# Patient Record
Sex: MALE | Race: WHITE | NOT HISPANIC OR LATINO | Employment: UNEMPLOYED | URBAN - METROPOLITAN AREA
[De-identification: names, ages, dates, MRNs, and addresses within clinical notes are randomized per-mention and may not be internally consistent; named-entity substitution may affect disease eponyms.]

---

## 2020-11-01 ENCOUNTER — HOSPITAL ENCOUNTER (INPATIENT)
Facility: MEDICAL CENTER | Age: 40
LOS: 1 days | DRG: 390 | End: 2020-11-02
Attending: EMERGENCY MEDICINE | Admitting: STUDENT IN AN ORGANIZED HEALTH CARE EDUCATION/TRAINING PROGRAM
Payer: MEDICAID

## 2020-11-01 ENCOUNTER — HOSPITAL ENCOUNTER (OUTPATIENT)
Dept: RADIOLOGY | Facility: MEDICAL CENTER | Age: 40
End: 2020-11-01
Payer: MEDICAID

## 2020-11-01 ENCOUNTER — APPOINTMENT (OUTPATIENT)
Dept: RADIOLOGY | Facility: MEDICAL CENTER | Age: 40
DRG: 390 | End: 2020-11-01
Attending: STUDENT IN AN ORGANIZED HEALTH CARE EDUCATION/TRAINING PROGRAM
Payer: MEDICAID

## 2020-11-01 DIAGNOSIS — R11.2 NAUSEA AND VOMITING, INTRACTABILITY OF VOMITING NOT SPECIFIED, UNSPECIFIED VOMITING TYPE: ICD-10-CM

## 2020-11-01 DIAGNOSIS — K56.1 INTUSSUSCEPTION (HCC): ICD-10-CM

## 2020-11-01 PROBLEM — Z86.19 HISTORY OF CLOSTRIDIOIDES DIFFICILE COLITIS: Status: ACTIVE | Noted: 2020-11-01

## 2020-11-01 PROBLEM — R10.30 LOWER ABDOMINAL PAIN: Status: ACTIVE | Noted: 2020-11-01

## 2020-11-01 PROBLEM — F17.200 NICOTINE DEPENDENCE: Status: ACTIVE | Noted: 2020-11-01

## 2020-11-01 LAB
AMPHET UR QL SCN: NEGATIVE
ANION GAP SERPL CALC-SCNC: 9 MMOL/L (ref 7–16)
APPEARANCE UR: CLEAR
BARBITURATES UR QL SCN: NEGATIVE
BASOPHILS # BLD AUTO: 0.3 % (ref 0–1.8)
BASOPHILS # BLD: 0.03 K/UL (ref 0–0.12)
BENZODIAZ UR QL SCN: NEGATIVE
BILIRUB UR QL STRIP.AUTO: NEGATIVE
BUN SERPL-MCNC: 6 MG/DL (ref 8–22)
BZE UR QL SCN: NEGATIVE
CALCIUM SERPL-MCNC: 7.6 MG/DL (ref 8.5–10.5)
CANNABINOIDS UR QL SCN: POSITIVE
CHLORIDE SERPL-SCNC: 108 MMOL/L (ref 96–112)
CO2 SERPL-SCNC: 24 MMOL/L (ref 20–33)
COLOR UR: YELLOW
COVID ORDER STATUS COVID19: NORMAL
CREAT SERPL-MCNC: 0.63 MG/DL (ref 0.5–1.4)
EOSINOPHIL # BLD AUTO: 0.03 K/UL (ref 0–0.51)
EOSINOPHIL NFR BLD: 0.3 % (ref 0–6.9)
ERYTHROCYTE [DISTWIDTH] IN BLOOD BY AUTOMATED COUNT: 45.8 FL (ref 35.9–50)
GLUCOSE SERPL-MCNC: 85 MG/DL (ref 65–99)
GLUCOSE UR STRIP.AUTO-MCNC: NEGATIVE MG/DL
HCT VFR BLD AUTO: 40.6 % (ref 42–52)
HGB BLD-MCNC: 13.8 G/DL (ref 14–18)
IMM GRANULOCYTES # BLD AUTO: 0.01 K/UL (ref 0–0.11)
IMM GRANULOCYTES NFR BLD AUTO: 0.1 % (ref 0–0.9)
KETONES UR STRIP.AUTO-MCNC: ABNORMAL MG/DL
LEUKOCYTE ESTERASE UR QL STRIP.AUTO: NEGATIVE
LIPASE SERPL-CCNC: 15 U/L (ref 11–82)
LYMPHOCYTES # BLD AUTO: 2.16 K/UL (ref 1–4.8)
LYMPHOCYTES NFR BLD: 24.9 % (ref 22–41)
MCH RBC QN AUTO: 32 PG (ref 27–33)
MCHC RBC AUTO-ENTMCNC: 34 G/DL (ref 33.7–35.3)
MCV RBC AUTO: 94.2 FL (ref 81.4–97.8)
METHADONE UR QL SCN: NEGATIVE
MICRO URNS: ABNORMAL
MONOCYTES # BLD AUTO: 0.65 K/UL (ref 0–0.85)
MONOCYTES NFR BLD AUTO: 7.5 % (ref 0–13.4)
NEUTROPHILS # BLD AUTO: 5.8 K/UL (ref 1.82–7.42)
NEUTROPHILS NFR BLD: 66.9 % (ref 44–72)
NITRITE UR QL STRIP.AUTO: NEGATIVE
NRBC # BLD AUTO: 0 K/UL
NRBC BLD-RTO: 0 /100 WBC
OPIATES UR QL SCN: POSITIVE
OXYCODONE UR QL SCN: NEGATIVE
PCP UR QL SCN: NEGATIVE
PH UR STRIP.AUTO: 5 [PH] (ref 5–8)
PLATELET # BLD AUTO: 215 K/UL (ref 164–446)
PMV BLD AUTO: 10.7 FL (ref 9–12.9)
POTASSIUM SERPL-SCNC: 3.9 MMOL/L (ref 3.6–5.5)
PROPOXYPH UR QL SCN: NEGATIVE
PROT UR QL STRIP: NEGATIVE MG/DL
RBC # BLD AUTO: 4.31 M/UL (ref 4.7–6.1)
RBC UR QL AUTO: NEGATIVE
SARS-COV-2 RNA RESP QL NAA+PROBE: NOTDETECTED
SODIUM SERPL-SCNC: 141 MMOL/L (ref 135–145)
SP GR UR REFRACTOMETRY: >1.045
SPECIMEN SOURCE: NORMAL
UROBILINOGEN UR STRIP.AUTO-MCNC: 0.2 MG/DL
WBC # BLD AUTO: 8.7 K/UL (ref 4.8–10.8)

## 2020-11-01 PROCEDURE — 770001 HCHG ROOM/CARE - MED/SURG/GYN PRIV*

## 2020-11-01 PROCEDURE — 81003 URINALYSIS AUTO W/O SCOPE: CPT

## 2020-11-01 PROCEDURE — 99223 1ST HOSP IP/OBS HIGH 75: CPT | Performed by: STUDENT IN AN ORGANIZED HEALTH CARE EDUCATION/TRAINING PROGRAM

## 2020-11-01 PROCEDURE — 71045 X-RAY EXAM CHEST 1 VIEW: CPT

## 2020-11-01 PROCEDURE — 83690 ASSAY OF LIPASE: CPT

## 2020-11-01 PROCEDURE — U0003 INFECTIOUS AGENT DETECTION BY NUCLEIC ACID (DNA OR RNA); SEVERE ACUTE RESPIRATORY SYNDROME CORONAVIRUS 2 (SARS-COV-2) (CORONAVIRUS DISEASE [COVID-19]), AMPLIFIED PROBE TECHNIQUE, MAKING USE OF HIGH THROUGHPUT TECHNOLOGIES AS DESCRIBED BY CMS-2020-01-R: HCPCS

## 2020-11-01 PROCEDURE — 96374 THER/PROPH/DIAG INJ IV PUSH: CPT

## 2020-11-01 PROCEDURE — 99285 EMERGENCY DEPT VISIT HI MDM: CPT

## 2020-11-01 PROCEDURE — 700111 HCHG RX REV CODE 636 W/ 250 OVERRIDE (IP): Performed by: EMERGENCY MEDICINE

## 2020-11-01 PROCEDURE — 80048 BASIC METABOLIC PNL TOTAL CA: CPT

## 2020-11-01 PROCEDURE — 96372 THER/PROPH/DIAG INJ SC/IM: CPT

## 2020-11-01 PROCEDURE — 700105 HCHG RX REV CODE 258: Performed by: STUDENT IN AN ORGANIZED HEALTH CARE EDUCATION/TRAINING PROGRAM

## 2020-11-01 PROCEDURE — C9803 HOPD COVID-19 SPEC COLLECT: HCPCS | Performed by: EMERGENCY MEDICINE

## 2020-11-01 PROCEDURE — 85025 COMPLETE CBC W/AUTO DIFF WBC: CPT

## 2020-11-01 PROCEDURE — 700111 HCHG RX REV CODE 636 W/ 250 OVERRIDE (IP): Performed by: STUDENT IN AN ORGANIZED HEALTH CARE EDUCATION/TRAINING PROGRAM

## 2020-11-01 PROCEDURE — 80307 DRUG TEST PRSMV CHEM ANLYZR: CPT

## 2020-11-01 RX ORDER — ACETAMINOPHEN 325 MG/1
650 TABLET ORAL EVERY 6 HOURS PRN
Status: DISCONTINUED | OUTPATIENT
Start: 2020-11-01 | End: 2020-11-01

## 2020-11-01 RX ORDER — DICYCLOMINE HCL 20 MG
20 TABLET ORAL 2 TIMES DAILY
COMMUNITY

## 2020-11-01 RX ORDER — ONDANSETRON 2 MG/ML
4 INJECTION INTRAMUSCULAR; INTRAVENOUS EVERY 4 HOURS PRN
Status: DISCONTINUED | OUTPATIENT
Start: 2020-11-01 | End: 2020-11-02 | Stop reason: HOSPADM

## 2020-11-01 RX ORDER — LOPERAMIDE HYDROCHLORIDE 2 MG/1
2 CAPSULE ORAL 2 TIMES DAILY PRN
COMMUNITY

## 2020-11-01 RX ORDER — MORPHINE SULFATE 4 MG/ML
1 INJECTION, SOLUTION INTRAMUSCULAR; INTRAVENOUS EVERY 4 HOURS PRN
Status: DISCONTINUED | OUTPATIENT
Start: 2020-11-01 | End: 2020-11-02

## 2020-11-01 RX ORDER — ONDANSETRON 2 MG/ML
4 INJECTION INTRAMUSCULAR; INTRAVENOUS ONCE
Status: COMPLETED | OUTPATIENT
Start: 2020-11-01 | End: 2020-11-01

## 2020-11-01 RX ORDER — SODIUM CHLORIDE 9 MG/ML
INJECTION, SOLUTION INTRAVENOUS CONTINUOUS
Status: DISCONTINUED | OUTPATIENT
Start: 2020-11-01 | End: 2020-11-02 | Stop reason: HOSPADM

## 2020-11-01 RX ORDER — ACETAMINOPHEN 650 MG/1
975 SUPPOSITORY RECTAL EVERY 6 HOURS
Status: DISCONTINUED | OUTPATIENT
Start: 2020-11-02 | End: 2020-11-02

## 2020-11-01 RX ORDER — NICOTINE 21 MG/24HR
14 PATCH, TRANSDERMAL 24 HOURS TRANSDERMAL
Status: DISCONTINUED | OUTPATIENT
Start: 2020-11-02 | End: 2020-11-02 | Stop reason: HOSPADM

## 2020-11-01 RX ORDER — ONDANSETRON 4 MG/1
4 TABLET, ORALLY DISINTEGRATING ORAL EVERY 6 HOURS PRN
COMMUNITY

## 2020-11-01 RX ORDER — DICYCLOMINE HYDROCHLORIDE 10 MG/ML
20 INJECTION INTRAMUSCULAR ONCE
Status: COMPLETED | OUTPATIENT
Start: 2020-11-01 | End: 2020-11-01

## 2020-11-01 RX ADMIN — DICYCLOMINE HYDROCHLORIDE 20 MG: 20 INJECTION, SOLUTION INTRAMUSCULAR at 21:20

## 2020-11-01 RX ADMIN — ONDANSETRON 4 MG: 2 INJECTION INTRAMUSCULAR; INTRAVENOUS at 19:33

## 2020-11-01 RX ADMIN — MORPHINE SULFATE 1 MG: 4 INJECTION INTRAVENOUS at 22:44

## 2020-11-01 RX ADMIN — SODIUM CHLORIDE: 9 INJECTION, SOLUTION INTRAVENOUS at 21:20

## 2020-11-01 ASSESSMENT — COGNITIVE AND FUNCTIONAL STATUS - GENERAL
EATING MEALS: A LITTLE
DRESSING REGULAR LOWER BODY CLOTHING: A LITTLE
HELP NEEDED FOR BATHING: A LITTLE
SUGGESTED CMS G CODE MODIFIER DAILY ACTIVITY: CK
MOVING FROM LYING ON BACK TO SITTING ON SIDE OF FLAT BED: A LITTLE
DRESSING REGULAR UPPER BODY CLOTHING: A LITTLE
MOVING TO AND FROM BED TO CHAIR: A LITTLE
TURNING FROM BACK TO SIDE WHILE IN FLAT BAD: A LITTLE
DAILY ACTIVITIY SCORE: 18
MOBILITY SCORE: 18
PERSONAL GROOMING: A LITTLE
CLIMB 3 TO 5 STEPS WITH RAILING: A LITTLE
SUGGESTED CMS G CODE MODIFIER MOBILITY: CK
STANDING UP FROM CHAIR USING ARMS: A LITTLE
TOILETING: A LITTLE
WALKING IN HOSPITAL ROOM: A LITTLE

## 2020-11-01 ASSESSMENT — PAIN DESCRIPTION - PAIN TYPE: TYPE: ACUTE PAIN

## 2020-11-01 ASSESSMENT — ENCOUNTER SYMPTOMS
COUGH: 0
SHORTNESS OF BREATH: 0
WEAKNESS: 1
SORE THROAT: 0
NAUSEA: 1
MYALGIAS: 0
HEADACHES: 0
VOMITING: 1
DIARRHEA: 1
ABDOMINAL PAIN: 1
FOCAL WEAKNESS: 0
CHILLS: 1
FEVER: 1

## 2020-11-01 ASSESSMENT — LIFESTYLE VARIABLES
CONSUMPTION TOTAL: NEGATIVE
TOTAL SCORE: 0
TOTAL SCORE: 0
EVER HAD A DRINK FIRST THING IN THE MORNING TO STEADY YOUR NERVES TO GET RID OF A HANGOVER: NO
EVER FELT BAD OR GUILTY ABOUT YOUR DRINKING: NO
TOTAL SCORE: 0
ON A TYPICAL DAY WHEN YOU DRINK ALCOHOL HOW MANY DRINKS DO YOU HAVE: 0
DOES PATIENT WANT TO STOP DRINKING: NO
ALCOHOL_USE: NO
AVERAGE NUMBER OF DAYS PER WEEK YOU HAVE A DRINK CONTAINING ALCOHOL: 0
HAVE PEOPLE ANNOYED YOU BY CRITICIZING YOUR DRINKING: NO
HAVE YOU EVER FELT YOU SHOULD CUT DOWN ON YOUR DRINKING: NO
HOW MANY TIMES IN THE PAST YEAR HAVE YOU HAD 5 OR MORE DRINKS IN A DAY: 0

## 2020-11-01 ASSESSMENT — PATIENT HEALTH QUESTIONNAIRE - PHQ9
SUM OF ALL RESPONSES TO PHQ9 QUESTIONS 1 AND 2: 2
1. LITTLE INTEREST OR PLEASURE IN DOING THINGS: SEVERAL DAYS
2. FEELING DOWN, DEPRESSED, IRRITABLE, OR HOPELESS: SEVERAL DAYS

## 2020-11-02 ENCOUNTER — APPOINTMENT (OUTPATIENT)
Dept: RADIOLOGY | Facility: MEDICAL CENTER | Age: 40
DRG: 390 | End: 2020-11-02
Attending: STUDENT IN AN ORGANIZED HEALTH CARE EDUCATION/TRAINING PROGRAM
Payer: MEDICAID

## 2020-11-02 VITALS
HEART RATE: 67 BPM | DIASTOLIC BLOOD PRESSURE: 81 MMHG | WEIGHT: 119.05 LBS | RESPIRATION RATE: 16 BRPM | BODY MASS INDEX: 19.83 KG/M2 | HEIGHT: 65 IN | TEMPERATURE: 99.5 F | OXYGEN SATURATION: 98 % | SYSTOLIC BLOOD PRESSURE: 139 MMHG

## 2020-11-02 PROBLEM — R10.30 LOWER ABDOMINAL PAIN: Status: RESOLVED | Noted: 2020-11-01 | Resolved: 2020-11-02

## 2020-11-02 PROBLEM — K56.1 INTUSSUSCEPTION (HCC): Status: RESOLVED | Noted: 2020-11-01 | Resolved: 2020-11-02

## 2020-11-02 PROBLEM — R11.2 INTRACTABLE VOMITING WITH NAUSEA: Status: RESOLVED | Noted: 2020-11-01 | Resolved: 2020-11-02

## 2020-11-02 LAB
ALBUMIN SERPL BCP-MCNC: 3.3 G/DL (ref 3.2–4.9)
ALBUMIN/GLOB SERPL: 1.9 G/DL
ALP SERPL-CCNC: 43 U/L (ref 30–99)
ALT SERPL-CCNC: 29 U/L (ref 2–50)
ANION GAP SERPL CALC-SCNC: 9 MMOL/L (ref 7–16)
AST SERPL-CCNC: 30 U/L (ref 12–45)
BILIRUB SERPL-MCNC: 1 MG/DL (ref 0.1–1.5)
BUN SERPL-MCNC: 10 MG/DL (ref 8–22)
CALCIUM SERPL-MCNC: 7.9 MG/DL (ref 8.5–10.5)
CHLORIDE SERPL-SCNC: 106 MMOL/L (ref 96–112)
CO2 SERPL-SCNC: 25 MMOL/L (ref 20–33)
CREAT SERPL-MCNC: 0.73 MG/DL (ref 0.5–1.4)
ERYTHROCYTE [DISTWIDTH] IN BLOOD BY AUTOMATED COUNT: 45.9 FL (ref 35.9–50)
GLOBULIN SER CALC-MCNC: 1.7 G/DL (ref 1.9–3.5)
GLUCOSE SERPL-MCNC: 69 MG/DL (ref 65–99)
HCT VFR BLD AUTO: 45 % (ref 42–52)
HGB BLD-MCNC: 15.1 G/DL (ref 14–18)
MCH RBC QN AUTO: 31.7 PG (ref 27–33)
MCHC RBC AUTO-ENTMCNC: 33.6 G/DL (ref 33.7–35.3)
MCV RBC AUTO: 94.5 FL (ref 81.4–97.8)
PLATELET # BLD AUTO: 229 K/UL (ref 164–446)
PMV BLD AUTO: 10.6 FL (ref 9–12.9)
POTASSIUM SERPL-SCNC: 3.8 MMOL/L (ref 3.6–5.5)
PROT SERPL-MCNC: 5 G/DL (ref 6–8.2)
RBC # BLD AUTO: 4.76 M/UL (ref 4.7–6.1)
SODIUM SERPL-SCNC: 140 MMOL/L (ref 135–145)
WBC # BLD AUTO: 7.8 K/UL (ref 4.8–10.8)

## 2020-11-02 PROCEDURE — 99239 HOSP IP/OBS DSCHRG MGMT >30: CPT | Performed by: INTERNAL MEDICINE

## 2020-11-02 PROCEDURE — 74250 X-RAY XM SM INT 1CNTRST STD: CPT

## 2020-11-02 PROCEDURE — 36415 COLL VENOUS BLD VENIPUNCTURE: CPT

## 2020-11-02 PROCEDURE — 80053 COMPREHEN METABOLIC PANEL: CPT

## 2020-11-02 PROCEDURE — 85027 COMPLETE CBC AUTOMATED: CPT

## 2020-11-02 PROCEDURE — 700105 HCHG RX REV CODE 258: Performed by: STUDENT IN AN ORGANIZED HEALTH CARE EDUCATION/TRAINING PROGRAM

## 2020-11-02 PROCEDURE — 700111 HCHG RX REV CODE 636 W/ 250 OVERRIDE (IP): Performed by: STUDENT IN AN ORGANIZED HEALTH CARE EDUCATION/TRAINING PROGRAM

## 2020-11-02 RX ORDER — ACETAMINOPHEN 650 MG/1
975 SUPPOSITORY RECTAL EVERY 6 HOURS PRN
Status: DISCONTINUED | OUTPATIENT
Start: 2020-11-02 | End: 2020-11-02 | Stop reason: HOSPADM

## 2020-11-02 RX ORDER — MORPHINE SULFATE 4 MG/ML
1 INJECTION, SOLUTION INTRAMUSCULAR; INTRAVENOUS EVERY 8 HOURS PRN
Status: DISCONTINUED | OUTPATIENT
Start: 2020-11-02 | End: 2020-11-02 | Stop reason: HOSPADM

## 2020-11-02 RX ADMIN — SODIUM CHLORIDE: 9 INJECTION, SOLUTION INTRAVENOUS at 12:35

## 2020-11-02 RX ADMIN — MORPHINE SULFATE 1 MG: 4 INJECTION INTRAVENOUS at 09:54

## 2020-11-02 RX ADMIN — ONDANSETRON 4 MG: 2 INJECTION INTRAMUSCULAR; INTRAVENOUS at 08:44

## 2020-11-02 RX ADMIN — ONDANSETRON 4 MG: 2 INJECTION INTRAMUSCULAR; INTRAVENOUS at 01:13

## 2020-11-02 RX ADMIN — MORPHINE SULFATE 1 MG: 4 INJECTION INTRAVENOUS at 05:45

## 2020-11-02 ASSESSMENT — ENCOUNTER SYMPTOMS
RESPIRATORY NEGATIVE: 1
VOMITING: 1
HEARTBURN: 0
NAUSEA: 1
PSYCHIATRIC NEGATIVE: 1
ABDOMINAL PAIN: 1
MUSCULOSKELETAL NEGATIVE: 1
CONSTITUTIONAL NEGATIVE: 1
NEUROLOGICAL NEGATIVE: 1

## 2020-11-02 ASSESSMENT — PAIN DESCRIPTION - PAIN TYPE
TYPE: ACUTE PAIN
TYPE: ACUTE PAIN

## 2020-11-02 NOTE — PROGRESS NOTES
2 RN skin check done with CHRISTINE Malagon.   Abrasions to bilateral thumbs.  All other skin intact with no signs or redness or skin breakdown.

## 2020-11-02 NOTE — PROGRESS NOTES
Bedside report received.  Assessment complete.  A&O x 4. Patient calls appropriately.  Patient ambulates with x1 assist.    Patient has 7/10 pain. Pain managed with prescribed medications.  Complains of nausea without vomiting. Zofran given. Strict NPO at this time.  + void, + flatus, - BM.  Patient denies SOB.  SCD's off.  Patient on contact isolation for rule out C-diff.   Review plan with of care with patient. Call light and personal belongings with in reach. Hourly rounding in place. All needs met at this time.

## 2020-11-02 NOTE — PROGRESS NOTES
[facility] ER REPORT  Consult/Admission requested by: Dr Babin  Chief complaint: Nausea and vomiting  Pertinent history/ER Course: Presented with nausea vomiting at outside facility, CT scan abdomen and pelvis showed small bowel obstruction secondary to intussusception.  Patient was then transferred here.  Surgery has been consulted on the case.  Patient also had low-grade fever here.  COVID-19 is pending  Code Status: Full per ERP, I personally verified with the ERP patient's code status and the ERP has confirmed this with the patient.   Patient meets admission criterion: Yes..  Recommendations given or work up & consultations requested per triage officer: Change COVID-19 to rapid test  Consultants involved and pertinent input from consultants: Surgery [facility]  Admission status: To be reviewed by case management and admitting physician.   Admission order placed: To be placed by admitting physician.   Floor requested: Per admitting physician  Assigned hospitalist: DR. Joya

## 2020-11-02 NOTE — ED TRIAGE NOTES
".  Chief Complaint   Patient presents with   • Abdominal Pain     Transfer from [place] for possible intussception and SBO      Pt BIB EMS for above complaints. Per report Pt went to ED a few days ago for \"food poisoning\" returned to ED today with N/V/D and abdominal pain. Pt given Dilaudid 1 mg and Zofran 8mg during transport.   "

## 2020-11-02 NOTE — CARE PLAN
Problem: Bowel/Gastric:  Goal: Normal bowel function is maintained or improved  Outcome: PROGRESSING AS EXPECTED  Note: Complains of nausea, medicated per MAR; advance diet as tolerated      Problem: Pain Management  Goal: Pain level will decrease to patient's comfort goal  Outcome: PROGRESSING AS EXPECTED  Note: Assess pain Q2-4H, administer pain medication as indicated, encourage patient to voice pian to staff; offer heat for additional pain control

## 2020-11-02 NOTE — H&P
Hospital Medicine History & Physical Note    Date of Service  11/1/2020    Primary Care Physician  Pcp Pt States None    Consultants  Surgery    Code Status  Full Code    Chief Complaint  Chief Complaint   Patient presents with   • Abdominal Pain     Transfer from [place] for possible intussception and SBO       History of Presenting Illness  39 y.o. male with h/o multiple C.diff infections, PUD transferred on 11/1/2020 from [place] for suspicion of small bowel obstruction.   Patient moved to [place] recently from [place]. One week ago he had food poisoning and was prescribed lopramide, dicyclomine, ondasetron. Patient was doing OK till today, this morning his abdominal pain, nausea, vomiting returned. Patient reports pain in lower abdomen, 6/10, stabbing in quality, intermittent. Had 3 watery bowel movements since yesterday, fever, chills, generalized weakness. Patient denies any chest pain, cough, SOB. Denies any recent antibiotic usage.      Review of Systems  Review of Systems   Constitutional: Positive for chills, fever and malaise/fatigue.   HENT: Negative for congestion and sore throat.    Respiratory: Negative for cough and shortness of breath.    Cardiovascular: Negative for chest pain and leg swelling.   Gastrointestinal: Positive for abdominal pain, diarrhea, nausea and vomiting.   Genitourinary: Negative for dysuria.   Musculoskeletal: Negative for myalgias.   Skin: Negative for rash.   Neurological: Positive for weakness. Negative for focal weakness and headaches.       Past Medical History  C. Diff multiple times since 2015, last one 2018  PUD    Surgical History   has no past surgical history on file.     Family History  Extensive cancer history on father's side of family, including lung cancer  Extensive history of HTN, DM on mother's side of family     Social History   reports that he has been smoking cigarettes. He does not have any smokeless tobacco history on file. He reports previous alcohol use. He  reports current drug use. Drug: Inhaled. Marijuana last time 5 days ago.    Allergies  No Known Allergies    Medications  None       Physical Exam  Temp:  [37.6 °C (99.6 °F)-37.9 °C (100.2 °F)] 37.6 °C (99.6 °F)  Pulse:  [49-61] 49  Resp:  [14-20] 20  BP: (102-111)/(55-66) 102/55  SpO2:  [90 %-97 %] 94 %    Physical Exam  Vitals signs and nursing note reviewed.   Constitutional:       Appearance: He is toxic-appearing.   HENT:      Head: Normocephalic and atraumatic.      Nose: Nose normal.      Mouth/Throat:      Mouth: Mucous membranes are moist.      Pharynx: Oropharynx is clear.   Eyes:      Extraocular Movements: Extraocular movements intact.      Conjunctiva/sclera: Conjunctivae normal.      Pupils: Pupils are equal, round, and reactive to light.   Neck:      Musculoskeletal: Normal range of motion and neck supple. No neck rigidity.   Cardiovascular:      Rate and Rhythm: Normal rate and regular rhythm.      Pulses: Normal pulses.      Heart sounds: No murmur.   Pulmonary:      Effort: Pulmonary effort is normal. No respiratory distress.      Breath sounds: Normal breath sounds. No wheezing.   Abdominal:      General: Abdomen is flat. Bowel sounds are normal.      Palpations: There is no mass.      Tenderness: There is abdominal tenderness. There is no guarding.   Musculoskeletal: Normal range of motion.         General: No swelling or tenderness.      Right lower leg: No edema.      Left lower leg: No edema.   Skin:     General: Skin is warm.   Neurological:      General: No focal deficit present.      Mental Status: He is oriented to person, place, and time.      Cranial Nerves: No cranial nerve deficit.   Psychiatric:         Mood and Affect: Mood normal.         Laboratory:  Recent Labs     11/01/20  1806   WBC 8.7   RBC 4.31*   HEMOGLOBIN 13.8*   HEMATOCRIT 40.6*   MCV 94.2   MCH 32.0   MCHC 34.0   RDW 45.8   PLATELETCT 215   MPV 10.7     Recent Labs     11/01/20  1806   SODIUM 141   POTASSIUM 3.9    CHLORIDE 108   CO2 24   GLUCOSE 85   BUN 6*   CREATININE 0.63   CALCIUM 7.6*     Recent Labs     11/01/20  1806   LIPASE 15   GLUCOSE 85         No results for input(s): NTPROBNP in the last 72 hours.      No results for input(s): TROPONINT in the last 72 hours.    Imaging:  OUTSIDE IMAGES-CT ABDOMEN /PELVIS   Final Result      OUTSIDE IMAGES-CT ABDOMEN /PELVIS   Final Result      DX-UPPER GI-SMALL BOWEL FOLLOW THRU    (Results Pending)   DX-CHEST-PORTABLE (1 VIEW)    (Results Pending)         Assessment/Plan:  I anticipate this patient will require at least two midnights for appropriate medical management, necessitating inpatient admission.    * Intussusception (HCC)  Assessment & Plan  Transferred from [place]  Surgery consulted  Will follow recommendations  Upper GI with small bowel follow-through     Lower abdominal pain  Assessment & Plan  Transferred from [place] for suspicion of SBO, intussusception  Surgery consult  Follow recommendations  Small bowel series  Pain management    Nicotine dependence  Assessment & Plan  Smokes 1/2 pack per day  Smoking cessation  Nicotine replacement protocol    Intractable vomiting with nausea  Assessment & Plan  Admits smoking marijuana occasionally, last time 5 days ago  ? Marijuana hyperemesis syndrome  zofran prn  Urine toxicology  IVF  NPO for now    History of Clostridioides difficile colitis  Assessment & Plan  Has h/o C.diff  Multiple recurrences since 2015, last infection in 2018  Denies any recent antibiotics  Check for C.diff  Contact isolation

## 2020-11-02 NOTE — ED PROVIDER NOTES
ED Provider Note    Scribed for Piotr Babin M.D. by Blaze Voss. 11/1/2020, 6:21 PM.    Primary care provider: None noted  Means of arrival: EMS  History obtained from: EMS, Patient  History limited by: None    CHIEF COMPLAINT  Chief Complaint   Patient presents with   • Abdominal Pain     Transfer from [place] for possible intussception and SBO       HPI  Remi Dahl is a 39 y.o. male with a history of C. Diff and peptic ulcer disease who presents to the Emergency Department as a transfer patient from [place] for abdominal pain. Patient states that a few day ago he went to the ED for evaluation as he suspected that he had food poisoning and was lower abdominal pain described as a cramping with associated nausea, vomiting and diarrhea. He was discharged home at that time, however returned to the ED in [place] today as his lower abdominal pain returned and he could not stop vomiting and continued to have diarrhea. While there he was found to have an intussusception and thus was transferred here for a higher level of care. Patient denies having any chest pain, shortness of breath, hematochezia or hematemesis. Patient is noted to have recently flown from [facility], and arrives with a temperature of 100.2 °F. He has not had any contact with known positive COVID patients.    REVIEW OF SYSTEMS  Pertinent positives include abdominal pain, nausea, vomiting. Pertinent negatives include no chest pain, fever, shortness of breath, hematochezia, hematemesis.  All other systems reviewed and negative.    PAST MEDICAL HISTORY  C. Difficile, Peptic ulcer disease    SURGICAL HISTORY  patient denies any surgical history    SOCIAL HISTORY  Social History     Tobacco Use   • Smoking status: Current Every Day Smoker     Types: Cigarettes   Substance Use Topics   • Alcohol use: Not Currently   • Drug use: Yes     Types: Inhaled     Comment: marijuana      Social History     Substance and Sexual Activity   Drug Use Yes   • Types: Inhaled  "   Comment: marijuana       FAMILY HISTORY  History reviewed. No pertinent family history.    CURRENT MEDICATIONS  Home Medications     Reviewed by Patricia Cho R.N. (Registered Nurse) on 11/01/20 at 1802  Med List Status: Not Addressed   Medication Last Dose Status        Patient Yan Taking any Medications                       ALLERGIES  No Known Allergies    PHYSICAL EXAM  VITAL SIGNS: /57   Pulse 61   Temp 37.9 °C (100.2 °F) (Temporal)   Resp 14   Ht 1.651 m (5' 5\")   Wt 54 kg (119 lb 0.8 oz)   SpO2 97%   BMI 19.81 kg/m²     Constitutional: Well developed, Well nourished, appears uncomfortable  HENT: Normocephalic, Atraumatic, mucous membranes moist, no erythema, exudates, swelling, or masses, nares patent  Eyes: nonicteric  Neck: Supple, no meningismus  Lymphatic: No lymphadenopathy noted.   Cardiovascular: Regular rate and rhythm, no gallops rubs or murmurs  Lungs: Clear bilaterally   Abdomen: Bowel sounds normal, Soft, No tenderness  Skin: Warm, Dry, no rash  Genitalia: Deferred  Rectal: Deferred  Extremities: No edema  Neurologic: Alert, appropriate, follows commands, moving all extremities, normal speech   Psychiatric: Affect normal    DIAGNOSTIC STUDIES / PROCEDURES    LABS  Results for orders placed or performed during the hospital encounter of 11/01/20   COVID/SARS CoV-2 PCR    Specimen: Nasopharyngeal; Respirate   Result Value Ref Range    COVID Order Status Received    CBC WITH DIFFERENTIAL   Result Value Ref Range    WBC 8.7 4.8 - 10.8 K/uL    RBC 4.31 (L) 4.70 - 6.10 M/uL    Hemoglobin 13.8 (L) 14.0 - 18.0 g/dL    Hematocrit 40.6 (L) 42.0 - 52.0 %    MCV 94.2 81.4 - 97.8 fL    MCH 32.0 27.0 - 33.0 pg    MCHC 34.0 33.7 - 35.3 g/dL    RDW 45.8 35.9 - 50.0 fL    Platelet Count 215 164 - 446 K/uL    MPV 10.7 9.0 - 12.9 fL    Neutrophils-Polys 66.90 44.00 - 72.00 %    Lymphocytes 24.90 22.00 - 41.00 %    Monocytes 7.50 0.00 - 13.40 %    Eosinophils 0.30 0.00 - 6.90 %    Basophils 0.30 " 0.00 - 1.80 %    Immature Granulocytes 0.10 0.00 - 0.90 %    Nucleated RBC 0.00 /100 WBC    Neutrophils (Absolute) 5.80 1.82 - 7.42 K/uL    Lymphs (Absolute) 2.16 1.00 - 4.80 K/uL    Monos (Absolute) 0.65 0.00 - 0.85 K/uL    Eos (Absolute) 0.03 0.00 - 0.51 K/uL    Baso (Absolute) 0.03 0.00 - 0.12 K/uL    Immature Granulocytes (abs) 0.01 0.00 - 0.11 K/uL    NRBC (Absolute) 0.00 K/uL     All labs reviewed by me.     RADIOLOGY  OUTSIDE IMAGES-CT ABDOMEN /PELVIS   Final Result      OUTSIDE IMAGES-CT ABDOMEN /PELVIS   Final Result        The radiologist's interpretation of all radiological studies have been reviewed by me.    COURSE & MEDICAL DECISION MAKING  Nursing notes, VS, PMSFHx reviewed in chart.     Review of patients outside lab work shows: wbc 9.6, hgb 16.9, platelets 267, sodium 142, potassium 3.7, bicarb 27, glucose 103, bun/creat 7/0.8, transiminase normal, urine negative, lactate 1.1, Troponin negative    6:21 PM Patient seen and examined at bedside. Ordered for Culture stool, CBC with differential, BMP, C Diff by PCR to evaluate. Dicussed with the patient that I am ordering for labs to further evaluate and will consult with surgery to further determine a plan of care. He understands and agrees.    6:32 PM - Paged Surgery    6:36 PM - I spoke with Dr. Orozco, Surgery, who requests to have the patient hospitalized and will consult.    7:22 PM - Paged Hospitalist    7:32 PM - I spoke with Dr. Villatoro, Hospitalist, who agrees to evaluate the patient for hospitalization.    Decision Making:  This is a 39 y.o. year old male who presents with what appears to be nausea vomiting and diarrhea-his nausea has been initially treated with oral antiemetics.  He then repeatedly had emesis and presented to the hospital in [place] and was found to have an intussusception.  They were concerned about the possibility of a small bowel obstruction.  The patient's labs from [place] appear to be reassuring with no significant  leukocytosis or transaminitis or electrolyte derangement.  The patient was transferred here for the intussusception.  The case this was discussed with Dr. Orozco who recommended upper GI with small bowel follow-through and we will admit the patient for symptomatic relief of nausea and vomiting.  He does have a history of recurrent C. difficile and so C. difficile was ordered.  We will also obtain a rapid Covid.    DISPOSITION:  Patient will be hospitalized by Dr. Villatoro, Hospitalist, in guarded condition.    FINAL IMPRESSION  1. Nausea and vomiting, intractability of vomiting not specified, unspecified vomiting type    2. Intussusception (HCC)          Blaze GREEN (Scribe), am scribing for, and in the presence of, Piotr Babin M.D..    Electronically signed by: Blaze Voss (Scribe), 11/1/2020    Piotr GREEN M.D. personally performed the services described in this documentation, as scribed by Blaze Voss in my presence, and it is both accurate and complete. C.    The note accurately reflects work and decisions made by me.  Piotr Babin M.D.  11/1/2020  7:36 PM

## 2020-11-02 NOTE — ED NOTES
Pt reporting increasing sharp abdominal pain. ERP updated and new orders received and given. Pt updated to notify RN for any new/worsening symptoms

## 2020-11-02 NOTE — ED NOTES
Report received from CHRISTINE Gomez. Pt resting comfortably in bed, declining any needs. Pt states his abdominal pain is manageable at this time but is requesting nausea medication. JOSSELYN Babin updated and new orders received.

## 2020-11-02 NOTE — PROGRESS NOTES
Received report from CHRISTINE Gil. Assumed care at 2300. Patient brought to the floor by transport. This pt is AOx4, ambulatory with standby assistance, c/o N/V this evening, (+) flatus, voiding appropriately, last BM: PTA, reporting lower abdomen pain that is 7/10, medicated per MAR. Patient and RN discussed plan of care, all questions answered. Labs noted, assessment complete, patient strict NPO at this time. Call light in place, personal belongings available, bed in lowest position, upper bedrails up, patient educated on importance of calling for assistance, hourly rounding in place. No additional needs at this time. VSS.

## 2020-11-02 NOTE — CARE PLAN
Problem: Communication  Goal: The ability to communicate needs accurately and effectively will improve  Outcome: PROGRESSING AS EXPECTED  Note: Patient educated to when in need of assistance. Call light within reach. All needs met at this time.      Problem: Safety  Goal: Will remain free from injury  Outcome: PROGRESSING AS EXPECTED  Note: Patient educated on fall risk and safety. Bed is locked and in lowest position. Call light within reach.   Goal: Will remain free from falls  Outcome: PROGRESSING AS EXPECTED

## 2020-11-02 NOTE — ED NOTES
Pt resting comfortably in gurney, equal and unlabored breathing. Awaiting room assignment, no needs at this time

## 2020-11-02 NOTE — ASSESSMENT & PLAN NOTE
Has h/o C.diff  Multiple recurrences since 2015, last infection in 2018  Denies any recent antibiotics  No diarrhea and no need for C. difficile test.

## 2020-11-02 NOTE — ASSESSMENT & PLAN NOTE
Transferred from [place]  Upper GI serial study did not show obstruction  Case was discussed with Dr. Orozco surgeon, start with advance diet and discharge if he tolerated regular diet.  No need for intervention or surgery.

## 2020-11-02 NOTE — ASSESSMENT & PLAN NOTE
Transferred from [place] for suspicion of SBO, intussusception  Surgery consult  Follow recommendations  Small bowel series  Pain management

## 2020-11-02 NOTE — ASSESSMENT & PLAN NOTE
Admits smoking marijuana occasionally, last time 5 days ago  ? Marijuana hyperemesis syndrome  zofran prn

## 2020-11-03 NOTE — DISCHARGE SUMMARY
Discharge Summary    CHIEF COMPLAINT ON ADMISSION  Chief Complaint   Patient presents with   • Abdominal Pain     Transfer from [place] for possible intussception and SBO       Reason for Admission  N/V  Intussusception     Admission Date  11/1/2020    CODE STATUS  Full Code    HPI & HOSPITAL COURSE    39-year-old male with history of multiple C. difficile infection, marijuana and PUD presented 11/1 from [place] for suspicious of small bowel obstruction, the patient came with nausea vomiting and abdominal pain, CT scan showed possible intussusception, the patient was transferred to our facility for surgery consult, discussed the case with  who recommended advance diet and no interventions needed, GI series study did not show any obstruction, the patient tolerated advanced diet and denied any N/V or abdominal pain, the patient felt better and insisted to leave.  Encouraged the patient to follow up with PCP and quit marijuana.  The patient has hx of Cdiff infection 2015 and on admission denied any diarrhea or fever, labs showed normal cbc and Cr.    Therefore, he is discharged in good and stable condition to home with close outpatient follow-up.    The patient recovered much more quickly than anticipated on admission.    Discharge Date  11/02/20      FOLLOW UP ITEMS POST DISCHARGE  Follow up with PCP     DISCHARGE DIAGNOSES  Principal Problem (Resolved):    Intussusception (HCC) POA: Unknown  Active Problems:    Nicotine dependence POA: Unknown    History of Clostridioides difficile colitis POA: Unknown  Resolved Problems:    Intractable vomiting with nausea POA: Unknown    Lower abdominal pain POA: Unknown      FOLLOW UP  Primary Care Provider    In 1 week  As needed      MEDICATIONS ON DISCHARGE     Medication List      CONTINUE taking these medications      Instructions   dicyclomine 20 MG Tabs  Commonly known as: BENTYL   Take 20 mg by mouth 2 Times a Day.  Dose: 20 mg     loperamide 2 MG Caps  Commonly known  as: IMODIUM   Take 2 mg by mouth 2 times a day as needed for Diarrhea.  Dose: 2 mg     ondansetron 4 MG Tbdp  Commonly known as: ZOFRAN ODT   Take 4 mg by mouth every 6 hours as needed for Nausea.  Dose: 4 mg            Allergies  No Known Allergies    DIET  Orders Placed This Encounter   Procedures   • Diet Order Diet: Regular     Standing Status:   Standing     Number of Occurrences:   1     Order Specific Question:   Diet:     Answer:   Regular [1]       ACTIVITY  As tolerated.  Weight bearing as tolerated    CONSULTATIONS  Surgery Dr Orozco     PROCEDURES  None     LABORATORY  Lab Results   Component Value Date    SODIUM 140 11/02/2020    POTASSIUM 3.8 11/02/2020    CHLORIDE 106 11/02/2020    CO2 25 11/02/2020    GLUCOSE 69 11/02/2020    BUN 10 11/02/2020    CREATININE 0.73 11/02/2020        Lab Results   Component Value Date    WBC 7.8 11/02/2020    HEMOGLOBIN 15.1 11/02/2020    HEMATOCRIT 45.0 11/02/2020    PLATELETCT 229 11/02/2020        Total time of the discharge process exceeds 33 minutes.

## 2020-11-03 NOTE — PROGRESS NOTES
Patient left without recieving discharge education or signing discharge paperwork. IV removed prior to patient departure.

## 2020-11-03 NOTE — PROGRESS NOTES
[facility] Medicine Daily Progress Note    Date of Service  11/2/2020    Chief Complaint  39 y.o. male admitted 11/1/2020 with abdominal pain    Hospital Course    39-year-old male with history of multiple C. difficile infection, marijuana and PUD presented 11/1 from [place] for suspicious of small bowel obstruction, the patient came with nausea vomiting and abdominal pain, CT scan showed possible intussusception, the patient was transferred to our facility for surgery consult, discussed the case with  who recommended advance diet and discharge if tolerated.      Interval Problem Update  -Evaluated examined the patient at bedside, start with clear liquid, tolerating well advance to regular diet.-  -GI serial study did not show any obstruction  -Follow-up with diet and discharge tolerated.    Consultants/Specialty  General surgeon    Code Status  Full Code    Disposition  Home tomorrow if tolerated diet.    Review of Systems  Review of Systems   Constitutional: Negative.    HENT: Negative.    Respiratory: Negative.    Gastrointestinal: Positive for abdominal pain, nausea and vomiting. Negative for heartburn.   Genitourinary: Negative.    Musculoskeletal: Negative.    Skin: Negative.    Neurological: Negative.    Psychiatric/Behavioral: Negative.         Physical Exam  Temp:  [36.9 °C (98.5 °F)-37.9 °C (100.2 °F)] 37.2 °C (99 °F)  Pulse:  [45-65] 65  Resp:  [14-20] 16  BP: ()/(55-80) 142/80  SpO2:  [90 %-98 %] 98 %    Physical Exam  Constitutional:       Appearance: He is not ill-appearing.   Eyes:      General: No scleral icterus.  Cardiovascular:      Rate and Rhythm: Normal rate.      Heart sounds: No murmur.   Pulmonary:      Effort: Pulmonary effort is normal.      Breath sounds: No wheezing.   Abdominal:      General: Abdomen is flat. Bowel sounds are normal. There is no distension.      Palpations: Abdomen is soft.      Tenderness: There is abdominal tenderness. There is no right CVA tenderness or  guarding.   Musculoskeletal:         General: No swelling or deformity.      Right lower leg: No edema.      Left lower leg: No edema.   Skin:     General: Skin is warm.      Coloration: Skin is not jaundiced.      Findings: No bruising.   Neurological:      General: No focal deficit present.      Mental Status: He is alert and oriented to person, place, and time.      Cranial Nerves: No cranial nerve deficit.      Motor: No weakness.      Gait: Gait normal.   Psychiatric:         Mood and Affect: Mood normal.         Fluids    Intake/Output Summary (Last 24 hours) at 11/2/2020 1645  Last data filed at 11/2/2020 1210  Gross per 24 hour   Intake 1457.33 ml   Output 1200 ml   Net 257.33 ml       Laboratory  Recent Labs     11/01/20  1806 11/02/20  0650   WBC 8.7 7.8   RBC 4.31* 4.76   HEMOGLOBIN 13.8* 15.1   HEMATOCRIT 40.6* 45.0   MCV 94.2 94.5   MCH 32.0 31.7   MCHC 34.0 33.6*   RDW 45.8 45.9   PLATELETCT 215 229   MPV 10.7 10.6     Recent Labs     11/01/20  1806 11/02/20  0650   SODIUM 141 140   POTASSIUM 3.9 3.8   CHLORIDE 108 106   CO2 24 25   GLUCOSE 85 69   BUN 6* 10   CREATININE 0.63 0.73   CALCIUM 7.6* 7.9*                   Imaging  DX-SMALL BOWEL SERIES   Final Result      No abnormalities are noted on small bowel follow-through.      DX-CHEST-PORTABLE (1 VIEW)   Final Result         1.  No focal infiltrates.   2.  Perihilar interstitial prominence and bronchial wall cuffing, appearance suggests changes of underlying bronchial inflammation, consider bronchitis.      OUTSIDE IMAGES-CT ABDOMEN /PELVIS   Final Result      OUTSIDE IMAGES-CT ABDOMEN /PELVIS   Final Result           Assessment/Plan  * Intussusception (HCC)  Assessment & Plan  Transferred from [place]  Upper GI serial study did not show obstruction  Case was discussed with Dr. Orozco surgeon, start with advance diet and discharge if he tolerated regular diet.  No need for intervention or surgery.    Lower abdominal pain  Assessment & Plan  Transferred  from [place] for suspicion of SBO, intussusception  Surgery consult  Follow recommendations  Small bowel series  Pain management    Nicotine dependence  Assessment & Plan  Smokes 1/2 pack per day  Smoking cessation  Nicotine replacement protocol    Intractable vomiting with nausea  Assessment & Plan  Admits smoking marijuana occasionally, last time 5 days ago  ? Marijuana hyperemesis syndrome  zofran prn        History of Clostridioides difficile colitis  Assessment & Plan  Has h/o C.diff  Multiple recurrences since 2015, last infection in 2018  Denies any recent antibiotics  No diarrhea and no need for C. difficile test.       VTE prophylaxis: lovenox

## 2020-11-03 NOTE — PROGRESS NOTES
CNA informed this RN that patient had a death in the family and needed to leave. Dr. Anthony notified that patient would be leaving. As patient is tolerating regular diet without abdominal cramping, nausea, or vomiting, Dr. Anthony will be putting in discharge orders.

## 2020-11-03 NOTE — DISCHARGE INSTRUCTIONS
Discharge Instructions    Discharged to home by car with relative. Discharged via wheelchair, hospital escort: Yes.  Special equipment needed: Not Applicable    Be sure to schedule a follow-up appointment with your primary care doctor or any specialists as instructed.     Discharge Plan:   Influenza Vaccine Indication: Patient Refuses    I understand that a diet low in cholesterol, fat, and sodium is recommended for good health. Unless I have been given specific instructions below for another diet, I accept this instruction as my diet prescription.     Special Instructions: None    · Is patient discharged on Warfarin / Coumadin?   No     Depression / Suicide Risk    As you are discharged from this [facility] facility, it is important to learn how to keep safe from harming yourself.    Recognize the warning signs:  · Abrupt changes in personality, positive or negative- including increase in energy   · Giving away possessions  · Change in eating patterns- significant weight changes-  positive or negative  · Change in sleeping patterns- unable to sleep or sleeping all the time   · Unwillingness or inability to communicate  · Depression  · Unusual sadness, discouragement and loneliness  · Talk of wanting to die  · Neglect of personal appearance   · Rebelliousness- reckless behavior  · Withdrawal from people/activities they love  · Confusion- inability to concentrate     If you or a loved one observes any of these behaviors or has concerns about self-harm, here's what you can do:  · Talk about it- your feelings and reasons for harming yourself  · Remove any means that you might use to hurt yourself (examples: pills, rope, extension cords, firearm)  · Get professional help from the community (Mental Health, Substance Abuse, psychological counseling)  · Do not be alone:Call your Safe Contact- someone whom you trust who will be there for you.  · Call your local CRISIS HOTLINE 451-0182 or 190-688-9544  · Call your local  Children's Mobile Crisis Response Team [place] (423) 906-1819 or www.[employer]Glints  · Call the toll free National Suicide Prevention Hotlines   · National Suicide Prevention Lifeline 748-552-JHBH (7954)  · National Hope Line Network 800-SUICIDE (774-3872)

## 2024-10-04 DIAGNOSIS — R40.4 ALTERED CONSCIOUSNESS: Primary | ICD-10-CM

## 2024-10-12 LAB — EKG IMPRESSION: NORMAL

## 2024-10-12 PROCEDURE — 93010 ELECTROCARDIOGRAM REPORT: CPT | Performed by: INTERNAL MEDICINE

## 2025-07-24 DIAGNOSIS — R07.89 OTHER CHEST PAIN: ICD-10-CM

## 2025-07-24 DIAGNOSIS — R06.02 SHORTNESS OF BREATH: Primary | ICD-10-CM

## 2025-07-31 LAB — EKG IMPRESSION: NORMAL
